# Patient Record
Sex: FEMALE | Race: WHITE | URBAN - METROPOLITAN AREA
[De-identification: names, ages, dates, MRNs, and addresses within clinical notes are randomized per-mention and may not be internally consistent; named-entity substitution may affect disease eponyms.]

---

## 2014-03-14 LAB
ALBUMIN SERPL-MCNC: NORMAL G/DL
ALP BLD-CCNC: NORMAL U/L
ALT SERPL-CCNC: NORMAL U/L
ANION GAP SERPL CALCULATED.3IONS-SCNC: NORMAL MMOL/L
AST SERPL-CCNC: NORMAL U/L
BILIRUB SERPL-MCNC: NORMAL MG/DL
BUN BLDV-MCNC: NORMAL MG/DL
CALCIUM SERPL-MCNC: NORMAL MG/DL
CHLORIDE BLD-SCNC: NORMAL MMOL/L
CHOLESTEROL, TOTAL: NORMAL
CHOLESTEROL/HDL RATIO: NORMAL
CO2: NORMAL
CREAT SERPL-MCNC: NORMAL MG/DL
GFR CALCULATED: NORMAL
GLUCOSE BLD-MCNC: NORMAL MG/DL
HDLC SERPL-MCNC: NORMAL MG/DL
LDL CHOLESTEROL CALCULATED: NORMAL
POTASSIUM SERPL-SCNC: NORMAL MMOL/L
SODIUM BLD-SCNC: NORMAL MMOL/L
TOTAL PROTEIN: NORMAL
TRIGL SERPL-MCNC: NORMAL MG/DL
VLDLC SERPL CALC-MCNC: NORMAL MG/DL

## 2018-03-27 ENCOUNTER — EMPLOYEE WELLNESS (OUTPATIENT)
Dept: OTHER | Age: 23
End: 2018-03-27

## 2018-03-27 LAB
CHOLESTEROL, TOTAL: 148 MG/DL (ref 0–199)
GLUCOSE BLD-MCNC: 103 MG/DL (ref 74–107)
HDLC SERPL-MCNC: 72 MG/DL
LDL CHOLESTEROL CALCULATED: 66 MG/DL (ref 0–99)
TRIGL SERPL-MCNC: 48 MG/DL (ref 0–149)

## 2018-04-02 VITALS — BODY MASS INDEX: 24.11 KG/M2 | WEIGHT: 168 LBS

## 2018-05-21 ENCOUNTER — NURSE TRIAGE (OUTPATIENT)
Dept: OTHER | Facility: CLINIC | Age: 23
End: 2018-05-21

## 2019-07-15 ENCOUNTER — NURSE TRIAGE (OUTPATIENT)
Dept: OTHER | Facility: CLINIC | Age: 24
End: 2019-07-15
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
## 2020-07-13 ENCOUNTER — EMPLOYEE WELLNESS (OUTPATIENT)
Dept: OTHER | Age: 25
End: 2020-07-13

## 2020-07-13 LAB
CHOLESTEROL, TOTAL: 154 MG/DL (ref 0–199)
GLUCOSE BLD-MCNC: 91 MG/DL (ref 74–107)
HDLC SERPL-MCNC: 64 MG/DL
LDL CHOLESTEROL CALCULATED: 78 MG/DL (ref 0–99)
TRIGL SERPL-MCNC: 58 MG/DL (ref 0–149)

## 2020-10-19 VITALS — BODY MASS INDEX: 27.55 KG/M2 | WEIGHT: 192 LBS

## 2021-06-30 LAB
CHOLESTEROL, TOTAL: 206 MG/DL (ref 0–199)
GLUCOSE BLD-MCNC: 96 MG/DL (ref 74–107)
HDLC SERPL-MCNC: 60 MG/DL
LDL CHOLESTEROL CALCULATED: 120 MG/DL (ref 0–99)
TRIGL SERPL-MCNC: 130 MG/DL (ref 0–149)

## 2022-05-25 LAB
CHOLESTEROL, TOTAL: 184 MG/DL (ref 0–199)
GLUCOSE BLD-MCNC: 84 MG/DL (ref 74–107)
HDLC SERPL-MCNC: 57 MG/DL
LDL CHOLESTEROL CALCULATED: 109 MG/DL (ref 0–99)
TRIGL SERPL-MCNC: 91 MG/DL (ref 0–149)

## 2023-06-02 LAB
CHOLESTEROL, TOTAL: 186 MG/DL (ref 0–199)
GLUCOSE SERPL-MCNC: 89 MG/DL (ref 74–107)
HDLC SERPL-MCNC: 57 MG/DL
LDLC SERPL CALC-MCNC: 118 MG/DL (ref 0–99)
TRIGL SERPL-MCNC: 55 MG/DL (ref 0–149)

## 2025-05-27 LAB
CHOLEST SERPL-MCNC: 174 MG/DL
GLUCOSE SERPL-MCNC: 94 MG/DL (ref 74–99)
HDLC SERPL-MCNC: 82 MG/DL
LDLC SERPL CALC-MCNC: 81 MG/DL
PATIENT FASTING?: YES
TRIGL SERPL-MCNC: 53 MG/DL
VLDLC SERPL CALC-MCNC: 11 MG/DL